# Patient Record
Sex: MALE | Race: WHITE | NOT HISPANIC OR LATINO | Employment: OTHER | URBAN - METROPOLITAN AREA
[De-identification: names, ages, dates, MRNs, and addresses within clinical notes are randomized per-mention and may not be internally consistent; named-entity substitution may affect disease eponyms.]

---

## 2024-01-01 ENCOUNTER — HOME CARE VISIT (OUTPATIENT)
Dept: HOME HEALTH SERVICES | Facility: HOME HEALTHCARE | Age: 89
End: 2024-01-01
Payer: MEDICARE

## 2024-01-01 ENCOUNTER — HOME CARE VISIT (OUTPATIENT)
Dept: HOME HEALTH SERVICES | Facility: HOME HEALTHCARE | Age: 89
End: 2024-01-01

## 2024-01-01 ENCOUNTER — HOME CARE VISIT (OUTPATIENT)
Dept: HOME HOSPICE | Facility: HOSPICE | Age: 89
End: 2024-01-01
Payer: MEDICARE

## 2024-01-01 ENCOUNTER — HOSPICE ADMISSION (OUTPATIENT)
Dept: HOME HOSPICE | Facility: HOSPICE | Age: 89
End: 2024-01-01
Payer: MEDICARE

## 2024-01-01 ENCOUNTER — TELEPHONE (OUTPATIENT)
Dept: HOME HEALTH SERVICES | Facility: HOME HEALTHCARE | Age: 89
End: 2024-01-01

## 2024-01-01 VITALS
RESPIRATION RATE: 20 BRPM | TEMPERATURE: 98 F | HEART RATE: 62 BPM | SYSTOLIC BLOOD PRESSURE: 122 MMHG | DIASTOLIC BLOOD PRESSURE: 64 MMHG

## 2024-01-01 PROCEDURE — G0299 HHS/HOSPICE OF RN EA 15 MIN: HCPCS

## 2024-01-01 PROCEDURE — G0156 HHCP-SVS OF AIDE,EA 15 MIN: HCPCS

## 2024-01-01 PROCEDURE — 10330057 MEDICATION, GENERAL

## 2024-01-01 PROCEDURE — 10330087 HSPC SERVICE INTENSITY ADD-ON

## 2024-01-01 PROCEDURE — 10330064 CATH SECURE, STATLOCK FOLEY SWIVEL SIL P

## 2024-01-01 PROCEDURE — 10330064 UNDERPAD, REUSE 36X36 1DZ     BECKCL

## 2024-01-01 PROCEDURE — 10330064 BAG, URINE DRN (20/CS)        BARD

## 2024-01-01 PROCEDURE — G0155 HHCP-SVS OF CSW,EA 15 MIN: HCPCS

## 2024-01-01 PROCEDURE — 10330064

## 2024-01-01 PROCEDURE — 10330064 CATH TRAY, FOLEY SYR 10CC (20/CS)

## 2024-07-23 ENCOUNTER — HOME CARE VISIT (OUTPATIENT)
Dept: HOME HEALTH SERVICES | Facility: HOME HEALTHCARE | Age: 89
End: 2024-07-23
Payer: MEDICARE

## 2024-07-25 ENCOUNTER — HOME CARE VISIT (OUTPATIENT)
Dept: HOME HOSPICE | Facility: HOSPICE | Age: 89
End: 2024-07-25
Payer: MEDICARE

## 2024-07-25 ENCOUNTER — HOME CARE VISIT (OUTPATIENT)
Dept: HOME HEALTH SERVICES | Facility: HOME HEALTHCARE | Age: 89
End: 2024-07-25
Payer: MEDICARE

## 2024-07-25 VITALS
OXYGEN SATURATION: 97 % | DIASTOLIC BLOOD PRESSURE: 50 MMHG | WEIGHT: 155 LBS | HEART RATE: 63 BPM | SYSTOLIC BLOOD PRESSURE: 100 MMHG | BODY MASS INDEX: 26.46 KG/M2 | RESPIRATION RATE: 20 BRPM | TEMPERATURE: 98.1 F | HEIGHT: 64 IN

## 2024-07-25 PROCEDURE — G0299 HHS/HOSPICE OF RN EA 15 MIN: HCPCS

## 2024-07-26 ENCOUNTER — HOSPITAL ENCOUNTER (EMERGENCY)
Facility: HOSPITAL | Age: 89
Discharge: HOME | End: 2024-07-26
Attending: EMERGENCY MEDICINE
Payer: MEDICARE

## 2024-07-26 ENCOUNTER — HOME CARE VISIT (OUTPATIENT)
Dept: HOME HOSPICE | Facility: HOSPICE | Age: 89
End: 2024-07-26
Payer: MEDICARE

## 2024-07-26 ENCOUNTER — HOME CARE VISIT (OUTPATIENT)
Dept: HOME HEALTH SERVICES | Facility: HOME HEALTHCARE | Age: 89
End: 2024-07-26
Payer: MEDICARE

## 2024-07-26 VITALS
SYSTOLIC BLOOD PRESSURE: 110 MMHG | RESPIRATION RATE: 20 BRPM | HEART RATE: 90 BPM | TEMPERATURE: 98.2 F | OXYGEN SATURATION: 98 % | DIASTOLIC BLOOD PRESSURE: 47 MMHG

## 2024-07-26 DIAGNOSIS — B37.89 CANDIDA RASH OF GROIN: ICD-10-CM

## 2024-07-26 DIAGNOSIS — B02.9 HERPES ZOSTER: Primary | ICD-10-CM

## 2024-07-26 DIAGNOSIS — N48.89 FORESKIN SWELLING: ICD-10-CM

## 2024-07-26 PROCEDURE — G0156 HHCP-SVS OF AIDE,EA 15 MIN: HCPCS

## 2024-07-26 PROCEDURE — 99284 EMERGENCY DEPT VISIT MOD MDM: CPT

## 2024-07-26 PROCEDURE — 99282 EMERGENCY DEPT VISIT SF MDM: CPT

## 2024-07-26 RX ORDER — VALACYCLOVIR HYDROCHLORIDE 500 MG/1
500 TABLET, FILM COATED ORAL DAILY
Qty: 7 TABLET | Refills: 0 | Status: SHIPPED | OUTPATIENT
Start: 2024-07-26 | End: 2024-08-02

## 2024-07-26 RX ORDER — VALACYCLOVIR HYDROCHLORIDE 500 MG/1
500 TABLET, FILM COATED ORAL DAILY
Qty: 7 TABLET | Refills: 0 | Status: SHIPPED | OUTPATIENT
Start: 2024-07-26 | End: 2024-07-26

## 2024-07-26 RX ORDER — NYSTATIN 100000 [USP'U]/G
POWDER TOPICAL ONCE
Status: COMPLETED | OUTPATIENT
Start: 2024-07-26 | End: 2024-07-26

## 2024-07-26 RX ORDER — VALACYCLOVIR HYDROCHLORIDE 500 MG/1
500 TABLET, FILM COATED ORAL ONCE
Status: COMPLETED | OUTPATIENT
Start: 2024-07-26 | End: 2024-07-26

## 2024-07-26 RX ADMIN — NYSTATIN 1 APPLICATION: 100000 POWDER TOPICAL at 17:42

## 2024-07-26 RX ADMIN — VALACYCLOVIR HYDROCHLORIDE 500 MG: 500 TABLET, FILM COATED ORAL at 17:42

## 2024-07-26 NOTE — ED PROVIDER NOTES
History  Chief Complaint   Patient presents with    Urinary Catheter Insertion or Check     Per ems nursing staff removed pt urinary catheter and could not get a new one in. Pt on hospice with dnr     The patient is a 89-year-old male brought in by EMS for possible need of Yanes catheter.  His daughter at bedside provides information.  He was recently at [facility] for 8 days and found to have persistently abnormal kidney function test.  He was transitioned to hospice and left the hospital yesterday afternoon.  His daughter notes he has not urinated since leaving the hospital.  She expresses concern for needing a Yanes.  The hospice nurse at the home tried to place a Yanes without success.  He was sent here for further evaluation.  The patient is resting and does not evidence any pain.  History is limited by his history of dementia.      History provided by:  Patient and caregiver   used: No    Urinary Catheter Insertion or Check      Prior to Admission Medications   Prescriptions Last Dose Informant Patient Reported? Taking?   HYDROmorphone (DILAUDID) 2 mg tablet   Yes No   Sig: Take 2 mg by mouth 2 (two) times a day. may also take every 2 hours as needed  Indications: Pain, or dyspnea   LORazepam (ATIVAN) 0.5 mg tablet   Yes No   Sig: Take 0.5 mg by mouth 2 (two) times a day. may also take every 2 hours as needed  Indications: Feeling Anxious, Trouble Sleeping, or dyspnea   acetaminophen (TYLENOL) 650 mg suppository   Yes No   Sig: Insert 650 mg into the rectum every 8 (eight) hours as needed for fever or mild pain. CP MED ON HOLD  Indications: Fever, Pain   bisacodyl (DULCOLAX) 10 mg suppository   Yes No   Sig: Insert 10 mg into the rectum daily as needed for constipation. CP MED ON HOLD  Indications: Constipation   bumetanide (BUMEX) 1 mg tablet   Yes No   Sig: Take 2 mg by mouth daily. Indications: Cardiac Failure, Edema   dextromethorphan-guaiFENesin (ROBITUSSIN DM)  mg/5 mL  oral syrup   Yes No   Sig: Take 5 mL by mouth every 4 (four) hours as needed (cough). Indications: Cough   finasteride (PROSCAR) 5 mg tablet   Yes No   Sig: Take 5 mg by mouth daily. Exhaust patient supply then switch to formulary    Indications: Benign Enlargement of Prostate   haloperidol (HALDOL) oral concentrated solution   Yes No   Sig: Take 1 mg by mouth every 6 (six) hours as needed for agitation (OR N/V). CP MED ON HOLD  Indications: Nausea and Vomiting, Problems with Behavior   hyoscyamine (LEVSIN/SL) 0.125 mg SL tablet   Yes No   Sig: Take 0.125 mg by mouth every 4 (four) hours as needed (secretions). CP MED ON HOLD  Indications: terminal secretions   ipratropium-albuterol (DUO-NEB) 0.5-2.5 mg/3 mL nebulizer solution   Yes No   Sig: Take 3 mL by nebulization 4 (four) times a day as needed for shortness of breath or wheezing. Indications: wheeze/dyspnea   metoprolol tartrate (LOPRESSOR) 25 mg tablet   Yes No   Sig: Take 12.5 mg by mouth every 12 (twelve) hours. Indications: for the heart   morphine sulfate, concentrate, 100 mg/5mL concentrated solution   Yes No   Sig: Take 10 mg by mouth every 2 (two) hours as needed for severe pain (or dyspnea). PLACE MEDICATION ON HOLD WHEN HYDROMORPHONE ARRIVES  Indications: Difficulty Breathing, Pain   nystatin (MYCOSTATIN) powder   Yes No   Sig: Apply 1 Application topically 3 (three) times a day. Indications: rash   oxygen gas   Yes No   Sig: Inhale 2 L/min continuous. Indications: dyspnea   pantoprazole (PROTONIX) 20 mg tablet   Yes No   Sig: Take 20 mg by mouth daily. Exhaust patient supply then switch to formulary    Indications: Heartburn   polyethylene glycol (GLYCOLAX) 17 GM/SCOOP powder   Yes No   Sig: Take 17 g by mouth daily as needed (constipation). add to 8 oz fluid, dissolve and take by mouth  Indications: Constipation   prochlorperazine (COMPAZINE) 10 mg tablet   Yes No   Sig: Take 10 mg by mouth every 6 (six) hours as needed for nausea or vomiting. CP  MED ON HOLD  Indications: Nausea and Vomiting   senna-docusate sodium (Senna-S) 8.6-50 mg per tablet   Yes No   Sig: Take 1 tablet by mouth daily. Indications: Constipation   tamsulosin (FLOMAX) 0.4 mg   Yes No   Sig: Take 0.4 mg by mouth 2 (two) times a day. Exhaust patient supply then switch to formulary    Indications: Benign Enlargement of Prostate   zinc oxide (Balmex Complete Protection) 11.3 % cream   Yes No   Sig: Apply 1 Application topically 3 (three) times a day. Indications: skin irritation      Facility-Administered Medications: None       History reviewed. No pertinent past medical history.    History reviewed. No pertinent surgical history.    History reviewed. No pertinent family history.  I have reviewed and agree with the history as documented.    E-Cigarette/Vaping     E-Cigarette/Vaping Substances     Social History     Tobacco Use    Smoking status: Never    Smokeless tobacco: Never       Review of Systems   Unable to perform ROS: Dementia   Genitourinary:  Positive for difficulty urinating.        No urine output x 18 hours   Allergic/Immunologic: Food allergies: m.       Physical Exam  Physical Exam  Vitals and nursing note reviewed.   Constitutional:       General: He is not in acute distress.     Appearance: Normal appearance. He is normal weight. He is not ill-appearing or toxic-appearing.   HENT:      Head: Normocephalic and atraumatic.      Nose: Nose normal.      Mouth/Throat:      Mouth: Mucous membranes are moist.      Pharynx: Oropharynx is clear.   Eyes:      Extraocular Movements: Extraocular movements intact.      Conjunctiva/sclera: Conjunctivae normal.   Cardiovascular:      Rate and Rhythm: Normal rate.   Pulmonary:      Effort: Pulmonary effort is normal. No respiratory distress.   Genitourinary:     Penis: Uncircumcised. Tenderness, swelling and lesions present. No discharge.       Testes: Cremasteric reflex is present.         Right: Swelling (Trace) present.         Left:  Swelling (Trace) present.          Comments: Area of erythematous and vesicular rash consistent with herpes zoster.  Vesicles are not spontaneously opening yet.  He does have some erythema with white discharge to the penile shaft and meatus with satellite lesions consistent with candidal dermatitis.  There is some swelling of the foreskin but it is able to be retracted.  Musculoskeletal:         General: Normal range of motion.      Cervical back: Normal range of motion and neck supple.   Skin:     General: Skin is warm and dry.      Capillary Refill: Capillary refill takes less than 2 seconds.   Neurological:      General: No focal deficit present.      Mental Status: He is alert and oriented to person, place, and time. Mental status is at baseline.         Vital Signs  ED Triage Vitals   Temperature Pulse Respirations Blood Pressure SpO2   07/26/24 1658 07/26/24 1657 07/26/24 1657 07/26/24 1657 07/26/24 1657   98.2 °F (36.8 °C) 81 22 141/63 98 %      Temp src Heart Rate Source Patient Position - Orthostatic VS BP Location FiO2 (%)   -- -- -- -- --             Pain Score       --                  Vitals:    07/26/24 1657 07/26/24 1700   BP: 141/63 (!) 110/47   Pulse: 81 90         Visual Acuity      ED Medications  Medications   valACYclovir (VALTREX) tablet 500 mg (500 mg Oral Given 7/26/24 1742)   nystatin (MYCOSTATIN) powder (1 Application Topical Given 7/26/24 1742)       Diagnostic Studies  Results Reviewed       None                   No orders to display              Procedures  Procedures         ED Course                                               Medical Decision Making  DDx including but not limited to: Yanes catheter placement, KATHERINE, ESRD, metabolic abnormality, dehydration, candidal dermatitis, herpes zoster    The patient recently was placed on hospice and his daughter at bedside does not wish to do a further workup of his significant kidney impairment.  I discussed with no urine output x 18 hours  this is raising concern for worsening renal function and possible need for dialysis.  He has a bladder scan of 400 mL for which we will place a Yanes catheter.  Urine started spontaneously draining after placement of catheter.  He tolerated the procedure with minimal difficulty.  Of note, his exam is also with a candidal dermatitis to the penile shaft with some satellite lesions.  Will treat with nystatin powder.  Patient also with finding of herpes zoster rash to the right inguinal fold.  This was not present yesterday when his daughter last evaluated that area.  Will start him on renally dosed valacyclovir.  There are no exact records from the outside hospital, however his daughter is able to report a creatinine of 3.5 at time of discharge.  Will dose medication for creatinine clearance less than 10.  First dose given here.  Discussed case with on-call hospice JENNIFER Jernigan who is in agreement with plan.  I discussed with the patient's daughter that given his acutely worsening ability to make urine he likely is going into renal failure and he may pass soon.  She demonstrates that and continues to not wish to do blood work or further aggressive intervention such as hemodialysis.  The patient is pleasantly demented and able to answer some questions, he does not have complaints or pain right now.  Will discharge to home to continue with hospice plan.    Problems Addressed:  Candida rash of groin: acute illness or injury  Foreskin swelling: acute illness or injury  Herpes zoster: acute illness or injury    Amount and/or Complexity of Data Reviewed  Independent Historian: caregiver    Risk  OTC drugs.  Prescription drug management.  Decision regarding hospitalization.                 Disposition  Final diagnoses:   Herpes zoster   Foreskin swelling   Candida rash of groin     Time reflects when diagnosis was documented in both MDM as applicable and the Disposition within this note       Time User Action Codes  Description Comment    7/26/2024  5:26 PM Shugashaka, Corina Add [B02.9] Herpes zoster     7/26/2024  5:27 PM Shugars, Corina Add [N47.2] Paraphimosis     7/26/2024  5:27 PM Shugars, Corina Remove [N47.2] Paraphimosis     7/26/2024  5:29 PM Shugars, Corina Add [N48.89] Foreskin swelling     7/26/2024  5:30 PM Shugars, Corina Add [B37.89] Candida rash of groin           ED Disposition       ED Disposition   Discharge    Condition   Stable    Date/Time   Fri Jul 26, 2024  5:26 PM    Comment   Sabas Castaneda discharge to home/self care.                   Follow-up Information       Follow up With Specialties Details Why Contact Info Additional Information     [facility] Emergency Department Emergency Medicine Go to  If symptoms worsen [address] [id number] [phone] [facility] Emergency Department, [address], [place] [address]            Current Discharge Medication List        START taking these medications    Details   valACYclovir (Valtrex) 500 mg tablet Take 1 tablet (500 mg total) by mouth daily for 7 days For recurrent genital herpes  Qty: 7 tablet, Refills: 0    Associated Diagnoses: Herpes zoster           CONTINUE these medications which have NOT CHANGED    Details   acetaminophen (TYLENOL) 650 mg suppository Insert 650 mg into the rectum every 8 (eight) hours as needed for fever or mild pain. CP MED ON HOLD  Indications: Fever, Pain      bisacodyl (DULCOLAX) 10 mg suppository Insert 10 mg into the rectum daily as needed for constipation. CP MED ON HOLD  Indications: Constipation      bumetanide (BUMEX) 1 mg tablet Take 2 mg by mouth daily. Indications: Cardiac Failure, Edema      dextromethorphan-guaiFENesin (ROBITUSSIN DM)  mg/5 mL oral syrup Take 5 mL by mouth every 4 (four) hours as needed (cough). Indications: Cough      finasteride (PROSCAR) 5 mg tablet Take 5 mg by mouth daily. Exhaust patient supply then  switch to formulary    Indications: Benign Enlargement of Prostate      haloperidol (HALDOL) oral concentrated solution Take 1 mg by mouth every 6 (six) hours as needed for agitation (OR N/V). CP MED ON HOLD  Indications: Nausea and Vomiting, Problems with Behavior      HYDROmorphone (DILAUDID) 2 mg tablet Take 2 mg by mouth 2 (two) times a day. may also take every 2 hours as needed  Indications: Pain, or dyspnea      hyoscyamine (LEVSIN/SL) 0.125 mg SL tablet Take 0.125 mg by mouth every 4 (four) hours as needed (secretions). CP MED ON HOLD  Indications: terminal secretions      ipratropium-albuterol (DUO-NEB) 0.5-2.5 mg/3 mL nebulizer solution Take 3 mL by nebulization 4 (four) times a day as needed for shortness of breath or wheezing. Indications: wheeze/dyspnea      LORazepam (ATIVAN) 0.5 mg tablet Take 0.5 mg by mouth 2 (two) times a day. may also take every 2 hours as needed  Indications: Feeling Anxious, Trouble Sleeping, or dyspnea      metoprolol tartrate (LOPRESSOR) 25 mg tablet Take 12.5 mg by mouth every 12 (twelve) hours. Indications: for the heart      morphine sulfate, concentrate, 100 mg/5mL concentrated solution Take 10 mg by mouth every 2 (two) hours as needed for severe pain (or dyspnea). PLACE MEDICATION ON HOLD WHEN HYDROMORPHONE ARRIVES  Indications: Difficulty Breathing, Pain      nystatin (MYCOSTATIN) powder Apply 1 Application topically 3 (three) times a day. Indications: rash      oxygen gas Inhale 2 L/min continuous. Indications: dyspnea      pantoprazole (PROTONIX) 20 mg tablet Take 20 mg by mouth daily. Exhaust patient supply then switch to formulary    Indications: Heartburn      polyethylene glycol (GLYCOLAX) 17 GM/SCOOP powder Take 17 g by mouth daily as needed (constipation). add to 8 oz fluid, dissolve and take by mouth  Indications: Constipation      prochlorperazine (COMPAZINE) 10 mg tablet Take 10 mg by mouth every 6 (six) hours as needed for nausea or vomiting. CP MED ON HOLD   Indications: Nausea and Vomiting      senna-docusate sodium (Senna-S) 8.6-50 mg per tablet Take 1 tablet by mouth daily. Indications: Constipation      tamsulosin (FLOMAX) 0.4 mg Take 0.4 mg by mouth 2 (two) times a day. Exhaust patient supply then switch to formulary    Indications: Benign Enlargement of Prostate      zinc oxide (Balmex Complete Protection) 11.3 % cream Apply 1 Application topically 3 (three) times a day. Indications: skin irritation             No discharge procedures on file.    PDMP Review       None            ED Provider  Electronically Signed by             JENNIFER Umanzor  07/26/24 0736

## 2024-07-26 NOTE — ED NOTES
[facility] at bedside, brief report provided. Patient's daughter at bedside. Pt to go home to home hospice.      Nichole Lopez RN  07/26/24 1953

## 2024-07-26 NOTE — DISCHARGE INSTRUCTIONS
Use the prescribed valacyclovir (Valtrex) to treat your herpes zoster (shingles).  Follow-up with hospice in 3 to 5 days for reevaluation of your groin rash.

## 2024-08-08 ENCOUNTER — HOME CARE VISIT (OUTPATIENT)
Dept: HOME HOSPICE | Facility: HOSPICE | Age: 89
End: 2024-08-08
Payer: MEDICARE